# Patient Record
Sex: FEMALE | Race: WHITE | NOT HISPANIC OR LATINO
[De-identification: names, ages, dates, MRNs, and addresses within clinical notes are randomized per-mention and may not be internally consistent; named-entity substitution may affect disease eponyms.]

---

## 2019-11-13 PROBLEM — Z00.00 ENCOUNTER FOR PREVENTIVE HEALTH EXAMINATION: Status: ACTIVE | Noted: 2019-11-13

## 2019-11-19 ENCOUNTER — APPOINTMENT (OUTPATIENT)
Dept: PLASTIC SURGERY | Facility: CLINIC | Age: 49
End: 2019-11-19
Payer: COMMERCIAL

## 2019-11-19 VITALS
HEIGHT: 67 IN | WEIGHT: 135 LBS | SYSTOLIC BLOOD PRESSURE: 116 MMHG | BODY MASS INDEX: 21.19 KG/M2 | HEART RATE: 91 BPM | DIASTOLIC BLOOD PRESSURE: 74 MMHG

## 2019-11-19 PROCEDURE — 99203 OFFICE O/P NEW LOW 30 MIN: CPT

## 2019-11-20 NOTE — PHYSICAL EXAM
[Bra Size: _______] : Bra Size: [unfilled] [de-identified] : b/l baker grade 2 capsular contracture, severe waterfall deformity stage 2 ptosis and severe glandular ptosis b/l, well healed wise pattern incisions, no masses, no nipple inversion or discharge

## 2019-11-20 NOTE — ASSESSMENT
[FreeTextEntry1] : I reviewed with Debra the risks benefits and alternatives of implant removal, capsulectomy and mastopexy.The risks include: bleeding, infection, delayed healing, loss of the nipple areolar complex, decreased sensation to the nipple or hypersensitivity, asymmetry of the breast mounds, need for revision surgery, infection. We will perform total capsulectomy at the same time. We discussed BII and the fact that performing implant removal and capsulectomy may not change her current symptoms and that there is no known direct link between breast implants and the constellation of symptoms that is referred to as Breast Implant Illness but that certainly there are many woman who feel better after implant removal.  \par \par She is an excellent candidate for this procedure. Patient motivated to proceed with surgery. Will coordinate for near future. Going to [place] on 1/13/19 for 50th birthday party-will think about when she wants to proceed.\par

## 2019-11-20 NOTE — HISTORY OF PRESENT ILLNESS
[FreeTextEntry1] : 50 y/o F s/p bilateral mastopexy + bilateral submuscular augmentation with  Natrelle smooth round, moderate profile silicone implants in 2/2010. Since her surgery she reports experiencing symptoms of breast implant associated illness. She states her Hashimoto's thyroiditis has not been controlled since the placement of her breast implants. She also c/o multiple viruses, low iron, low vitamin D, vertigo, "brain fog", insomnia, "eye twitch in right eye," GI issues--states she has been hospitalized 5 times for ischemic colitis. \par \par She had a mammogram in September 2019 which did not show evidence of breast cancer.

## 2019-12-06 ENCOUNTER — RESULT REVIEW (OUTPATIENT)
Age: 49
End: 2019-12-06

## 2019-12-06 ENCOUNTER — OUTPATIENT (OUTPATIENT)
Dept: OUTPATIENT SERVICES | Facility: HOSPITAL | Age: 49
LOS: 1 days | Discharge: HOME | End: 2019-12-06
Payer: SELF-PAY

## 2019-12-06 PROCEDURE — 19316 MASTOPEXY: CPT | Mod: 50,59

## 2019-12-06 PROCEDURE — 19371 PERI-IMPLT CAPSLC BRST COMPL: CPT | Mod: 50

## 2019-12-06 PROCEDURE — 19328 RMVL INTACT BREAST IMPLANT: CPT | Mod: 50

## 2019-12-06 PROCEDURE — 88302 TISSUE EXAM BY PATHOLOGIST: CPT | Mod: 26

## 2019-12-06 RX ORDER — OXYCODONE 5 MG/1
5 TABLET ORAL
Qty: 15 | Refills: 0 | Status: ACTIVE | COMMUNITY
Start: 2019-12-06 | End: 1900-01-01

## 2019-12-08 RX ORDER — OXYCODONE 5 MG/1
5 TABLET ORAL
Qty: 15 | Refills: 0 | Status: ACTIVE | COMMUNITY
Start: 2019-12-08 | End: 1900-01-01

## 2019-12-08 RX ORDER — DIAZEPAM 5 MG/1
5 TABLET ORAL
Qty: 10 | Refills: 0 | Status: ACTIVE | COMMUNITY
Start: 2019-12-08 | End: 1900-01-01

## 2019-12-09 ENCOUNTER — CHART COPY (OUTPATIENT)
Age: 49
End: 2019-12-09

## 2019-12-10 ENCOUNTER — APPOINTMENT (OUTPATIENT)
Dept: PLASTIC SURGERY | Facility: CLINIC | Age: 49
End: 2019-12-10
Payer: COMMERCIAL

## 2019-12-10 VITALS
BODY MASS INDEX: 21.19 KG/M2 | HEIGHT: 67 IN | DIASTOLIC BLOOD PRESSURE: 68 MMHG | WEIGHT: 135 LBS | TEMPERATURE: 99.5 F | SYSTOLIC BLOOD PRESSURE: 106 MMHG | HEART RATE: 96 BPM

## 2019-12-10 PROCEDURE — 99024 POSTOP FOLLOW-UP VISIT: CPT

## 2019-12-13 ENCOUNTER — CHART COPY (OUTPATIENT)
Age: 49
End: 2019-12-13

## 2019-12-13 LAB — SURGICAL PATHOLOGY STUDY: SIGNIFICANT CHANGE UP

## 2019-12-13 RX ORDER — OXYCODONE 5 MG/1
5 TABLET ORAL
Qty: 12 | Refills: 0 | Status: COMPLETED | COMMUNITY
Start: 2019-12-13 | End: 2019-12-16

## 2019-12-16 NOTE — PHYSICAL EXAM
[de-identified] : breast mounds soft, NACs viable, no collections or hematoma, normal PO bruising. Incision lines C/D/I, good symmetry and contour

## 2019-12-16 NOTE — HISTORY OF PRESENT ILLNESS
[FreeTextEntry1] : 48 y/o F here 4 days PO s/p Bilateral removal of silicone breast implants, capsulectomy, bilateral cosmetic mastopexy with autoaugmentation. Reports intermittent fever (Tmax to 101). Overall she reports she is feeling well. Has not had a bowel movement. Has been taking colace. She took Senna, fleet enema, milk of magnesium yesterday and has not had a bowel movement yet.\par

## 2019-12-16 NOTE — SURGICAL HISTORY
[de-identified] : 12/6/19: Bilateral removal of silicone breast implants, capsulectomy, bilateral cosmetic mastopexy with autoaugmentation.

## 2019-12-27 PROBLEM — T85.9XXA COMPLICATION OF BREAST DEVICE, UNSPECIFIED COMPLICATION, INITIAL ENCOUNTER: Status: ACTIVE | Noted: 2019-11-20

## 2019-12-31 ENCOUNTER — APPOINTMENT (OUTPATIENT)
Dept: PLASTIC SURGERY | Facility: CLINIC | Age: 49
End: 2019-12-31
Payer: COMMERCIAL

## 2019-12-31 VITALS
TEMPERATURE: 98.7 F | OXYGEN SATURATION: 98 % | HEART RATE: 90 BPM | HEIGHT: 67 IN | BODY MASS INDEX: 21.19 KG/M2 | WEIGHT: 135 LBS

## 2019-12-31 DIAGNOSIS — I89.0 LYMPHEDEMA, NOT ELSEWHERE CLASSIFIED: ICD-10-CM

## 2019-12-31 DIAGNOSIS — G89.18 OTHER ACUTE POSTPROCEDURAL PAIN: ICD-10-CM

## 2019-12-31 DIAGNOSIS — T85.9XXA UNSPECIFIED COMPLICATION OF INTERNAL PROSTHETIC DEVICE, IMPLANT AND GRAFT, INITIAL ENCOUNTER: ICD-10-CM

## 2019-12-31 PROCEDURE — 99024 POSTOP FOLLOW-UP VISIT: CPT

## 2019-12-31 NOTE — PHYSICAL EXAM
[de-identified] : breast mounds soft, NACs viable, incisions C/D/I, no collections or hematoma, good contour,

## 2019-12-31 NOTE — ASSESSMENT
[FreeTextEntry1] : 3.5 weeks PO, healing well\par Recomend: \par Scar management,\par massage\par OT/PT\par Return to normal activities at 4 weeks except high impact (x 2 months)\par F/U in 3 months\par

## 2019-12-31 NOTE — SURGICAL HISTORY
[de-identified] : 12/6/19: Bilateral removal of silicone breast implants, capsulectomy, bilateral cosmetic mastopexy with autoaugmentation.

## 2019-12-31 NOTE — HISTORY OF PRESENT ILLNESS
[FreeTextEntry1] : 50 y/o F here 3.5 weeks PO on 12/6/19 s/p Bilateral removal of silicone breast implants, capsulectomy, bilateral cosmetic mastopexy with autoaugmentation. Doing well.\par

## 2020-06-09 ENCOUNTER — APPOINTMENT (OUTPATIENT)
Dept: PLASTIC SURGERY | Facility: CLINIC | Age: 50
End: 2020-06-09

## 2021-01-04 ENCOUNTER — NON-APPOINTMENT (OUTPATIENT)
Age: 51
End: 2021-01-04

## 2021-01-04 NOTE — HISTORY OF PRESENT ILLNESS
[FreeTextEntry1] : 50 y/o F here for annual check-up. She is s/p b/l removal of silicone breast implants, capsulectomy, bilateral cosmetic mastopexy with autoaugmentation on 12/6/19. Doing well.\par

## 2021-01-04 NOTE — SURGICAL HISTORY
[de-identified] : 12/6/19: Bilateral removal of silicone breast implants, capsulectomy, bilateral cosmetic mastopexy with autoaugmentation.

## 2021-01-04 NOTE — PHYSICAL EXAM
[de-identified] : breast mounds soft, NACs viable, incisions C/D/I, no collections or hematoma, good contour,

## 2021-01-05 ENCOUNTER — APPOINTMENT (OUTPATIENT)
Dept: PLASTIC SURGERY | Facility: CLINIC | Age: 51
End: 2021-01-05

## 2022-06-03 NOTE — ASSESSMENT
[FreeTextEntry1] : 4 days PO doing well, c.o. persistent cough and pain\par reviewed PO care instructions\par encouraged deep breathing and NSAIDS for pain\par If cough persists or fevers persist will have her see PCP to r/u URI / pneumonia, no indication for Abx at this time as breasts are healing well\par 
[Negative] : Heme/Lymph